# Patient Record
Sex: FEMALE | Race: WHITE | NOT HISPANIC OR LATINO | Employment: FULL TIME | URBAN - METROPOLITAN AREA
[De-identification: names, ages, dates, MRNs, and addresses within clinical notes are randomized per-mention and may not be internally consistent; named-entity substitution may affect disease eponyms.]

---

## 2023-12-19 ENCOUNTER — APPOINTMENT (OUTPATIENT)
Dept: RADIOLOGY | Facility: CLINIC | Age: 48
End: 2023-12-19
Payer: COMMERCIAL

## 2023-12-19 DIAGNOSIS — Z12.31 SCREENING MAMMOGRAM FOR BREAST CANCER: ICD-10-CM

## 2023-12-20 ENCOUNTER — APPOINTMENT (OUTPATIENT)
Dept: OBSTETRICS AND GYNECOLOGY | Facility: CLINIC | Age: 48
End: 2023-12-20
Payer: COMMERCIAL

## 2023-12-21 ENCOUNTER — APPOINTMENT (OUTPATIENT)
Dept: RADIOLOGY | Facility: CLINIC | Age: 48
End: 2023-12-21
Payer: COMMERCIAL

## 2023-12-21 DIAGNOSIS — Z12.31 SCREENING MAMMOGRAM FOR BREAST CANCER: ICD-10-CM

## 2023-12-29 ENCOUNTER — ANCILLARY PROCEDURE (OUTPATIENT)
Dept: RADIOLOGY | Facility: CLINIC | Age: 48
End: 2023-12-29
Payer: COMMERCIAL

## 2023-12-29 DIAGNOSIS — Z12.31 SCREENING MAMMOGRAM FOR BREAST CANCER: ICD-10-CM

## 2023-12-29 PROCEDURE — 77063 BREAST TOMOSYNTHESIS BI: CPT | Performed by: RADIOLOGY

## 2023-12-29 PROCEDURE — 77067 SCR MAMMO BI INCL CAD: CPT | Performed by: RADIOLOGY

## 2023-12-29 PROCEDURE — 77067 SCR MAMMO BI INCL CAD: CPT

## 2024-01-03 ENCOUNTER — APPOINTMENT (OUTPATIENT)
Dept: OBSTETRICS AND GYNECOLOGY | Facility: CLINIC | Age: 49
End: 2024-01-03
Payer: COMMERCIAL

## 2024-01-10 ENCOUNTER — APPOINTMENT (OUTPATIENT)
Dept: RADIOLOGY | Facility: CLINIC | Age: 49
End: 2024-01-10
Payer: COMMERCIAL

## 2024-01-17 ENCOUNTER — OFFICE VISIT (OUTPATIENT)
Dept: PRIMARY CARE | Facility: CLINIC | Age: 49
End: 2024-01-17
Payer: COMMERCIAL

## 2024-01-17 VITALS — WEIGHT: 202 LBS | SYSTOLIC BLOOD PRESSURE: 112 MMHG | DIASTOLIC BLOOD PRESSURE: 72 MMHG

## 2024-01-17 DIAGNOSIS — R92.8 ABNORMAL MAMMOGRAM: Primary | ICD-10-CM

## 2024-01-17 DIAGNOSIS — J30.1 ALLERGIC RHINITIS DUE TO POLLEN, UNSPECIFIED SEASONALITY: ICD-10-CM

## 2024-01-17 DIAGNOSIS — E03.9 HYPOTHYROIDISM, UNSPECIFIED TYPE: ICD-10-CM

## 2024-01-17 DIAGNOSIS — D64.9 ANEMIA, UNSPECIFIED TYPE: ICD-10-CM

## 2024-01-17 DIAGNOSIS — Z00.00 HEALTH CARE MAINTENANCE: ICD-10-CM

## 2024-01-17 DIAGNOSIS — E78.2 MIXED HYPERLIPIDEMIA: ICD-10-CM

## 2024-01-17 PROCEDURE — 99204 OFFICE O/P NEW MOD 45 MIN: CPT | Performed by: INTERNAL MEDICINE

## 2024-01-17 NOTE — PROGRESS NOTES
Subjective   Patient ID: Risa Manning is a 48 y.o. female who presents for No chief complaint on file..    HPI Sick visit have not seen in many years was in [place] father had had bypass surgery she herself has been well taking care of her Hashimoto's disease had been on thyroid medication recent blood test did show an elevated TSH and her dose was increased cholesterol have been lower with diet and exercise chest pain no shortness of breath but had a mammogram when she returned to the [place] and there was some asymmetry on the left side had not had a previous mammogram in [facility] sinus has been ok    Past medical history Hashimoto's hyperlipidemia allergic rhinitis    Medications levothyroxine 125 mg 1/2 tablet/day    Allergies tree pollen no known drug allergies    Social history no tobacco alcohol rare    Family history father ASCVD status post CABG mother Hashimoto's disease    Prevention limited exercise some weight change no recent blood work for review recent mammogram    Review of Systems    Objective   There were no vitals taken for this visit.    Physical Exam  Vital signs noted alert and oriented x 3 NCAT PERRLA EOMI nares clear discharge OP benign TM normal bilateral EAC clear bilateral no AC nodes no JVD or bruit no lid lag no thyromegaly chest clear to auscultation suprapubic seems to abdomen soft nontender normal active bowel sounds LS spine normal curvature negative straight leg raise extremities no clubbing cyanosis or edema normal distal pulses DTR 2+  Assessment/Plan    impression General medical examination?  Abnormal mammogram hypothyroidism hyperlipidemia other diagnoses anemia allergic rhinitis  Plan review any available blood work (none recent) she will send over what she has from what continue with her medication okay to schedule left diagnostic mammogram left breast ultrasound requisition submitted based on previous mammogram results done recently she is not in menopause also  has follow-up with her GYN in February 2024 check on blood count iron and colonoscopy routinely check on thyroid and lipids again in the future, may use flonase or zyrtec as needed for the nasal congestion, then recheck after 5 days TT 50 cc 26

## 2024-01-23 ENCOUNTER — HOSPITAL ENCOUNTER (OUTPATIENT)
Dept: RADIOLOGY | Facility: CLINIC | Age: 49
Discharge: HOME | End: 2024-01-23
Payer: COMMERCIAL

## 2024-01-23 DIAGNOSIS — R92.8 ABNORMAL MAMMOGRAM: ICD-10-CM

## 2024-01-23 PROCEDURE — 77061 BREAST TOMOSYNTHESIS UNI: CPT | Mod: LT

## 2024-01-23 PROCEDURE — 77061 BREAST TOMOSYNTHESIS UNI: CPT | Mod: LEFT SIDE | Performed by: STUDENT IN AN ORGANIZED HEALTH CARE EDUCATION/TRAINING PROGRAM

## 2024-01-23 PROCEDURE — 77065 DX MAMMO INCL CAD UNI: CPT | Mod: LEFT SIDE | Performed by: STUDENT IN AN ORGANIZED HEALTH CARE EDUCATION/TRAINING PROGRAM

## 2024-02-07 ENCOUNTER — OFFICE VISIT (OUTPATIENT)
Dept: OBSTETRICS AND GYNECOLOGY | Facility: CLINIC | Age: 49
End: 2024-02-07
Payer: COMMERCIAL

## 2024-02-07 VITALS
SYSTOLIC BLOOD PRESSURE: 128 MMHG | WEIGHT: 201.8 LBS | BODY MASS INDEX: 28.89 KG/M2 | TEMPERATURE: 98.4 F | HEIGHT: 70 IN | OXYGEN SATURATION: 99 % | DIASTOLIC BLOOD PRESSURE: 84 MMHG | HEART RATE: 75 BPM

## 2024-02-07 DIAGNOSIS — Z01.419 ENCOUNTER FOR ANNUAL ROUTINE GYNECOLOGICAL EXAMINATION: Primary | ICD-10-CM

## 2024-02-07 DIAGNOSIS — Z01.419 PAP SMEAR, AS PART OF ROUTINE GYNECOLOGICAL EXAMINATION: ICD-10-CM

## 2024-02-07 PROCEDURE — 87624 HPV HI-RISK TYP POOLED RSLT: CPT

## 2024-02-07 PROCEDURE — 1036F TOBACCO NON-USER: CPT | Performed by: OBSTETRICS & GYNECOLOGY

## 2024-02-07 PROCEDURE — 88175 CYTOPATH C/V AUTO FLUID REDO: CPT

## 2024-02-07 PROCEDURE — 99386 PREV VISIT NEW AGE 40-64: CPT | Performed by: OBSTETRICS & GYNECOLOGY

## 2024-02-07 ASSESSMENT — PAIN SCALES - GENERAL: PAINLEVEL: 0-NO PAIN

## 2024-02-07 NOTE — PROGRESS NOTES
Subjective   Patient ID: Risa Manning is a 49 y.o. female who [facility]  HPI  Patient is a 49-year-old  1 para 1 white female who had 1  section.  Her last menstrual period was 2024 she said they typically come once a month she will bleed for about 5 to 6 days currently does not use anything for birth control.  She admits to regular bowel movements denies any urinary symptoms.  Medical history significant for Hashimoto's thyroiditis.  She denies cigarette smoking currently works as a .  Family history negative for breast pelvic or colon cancer.  Denies any gynecologic issues.  Previous gynecologic exams in [place].  Review of Systems    Objective   Physical Exam  Gen.: Alert and in no acute distress. Well-developed, well-nourished.  Thyroid: Nonenlarged and no palpable thyroid nodules  Cardiovascular: Heart regular rate and rhythm  Pulmonary: Clear bilateral breath sounds  Breasts: Normal appearance, no nipple discharge and no skin changes. No palpable masses and no axillary lymphadenopathy  Abdomen: Soft and nontender, no abdominal mass palpated, no organomegaly   Pelvic: External genitalia normal, Bartholin's urethral and Belford's glands normal. Vagina normal. Cervix normal. Uterus normal size and shape. Right adnexa normal without masses. Left adnexa normal without masses. Perianal area and normal.  Assessment/Plan   Annual GYN exam, Pap and HPV done, recent normal mammogram with normal additional views.  Follow-up in 1 year or as needed.         Jhonathan Bernal MD 24 12:03 PM

## 2024-02-21 NOTE — RESULT ENCOUNTER NOTE
Your recent Pap smear and HPV test were completely normal. You should follow-up in 1 year for your annual GYN exam. If you have any questions please feel free to contact my office.

## 2024-03-06 ENCOUNTER — TELEPHONE (OUTPATIENT)
Dept: ALLERGY | Facility: CLINIC | Age: 49
End: 2024-03-06
Payer: COMMERCIAL

## 2024-03-11 ENCOUNTER — CONSULT (OUTPATIENT)
Dept: ALLERGY | Facility: CLINIC | Age: 49
End: 2024-03-11
Payer: COMMERCIAL

## 2024-03-11 VITALS
HEART RATE: 75 BPM | HEIGHT: 70 IN | BODY MASS INDEX: 28.49 KG/M2 | DIASTOLIC BLOOD PRESSURE: 81 MMHG | WEIGHT: 199 LBS | SYSTOLIC BLOOD PRESSURE: 120 MMHG

## 2024-03-11 DIAGNOSIS — J30.89 ALLERGIC RHINITIS DUE TO OTHER ALLERGIC TRIGGER, UNSPECIFIED SEASONALITY: Primary | ICD-10-CM

## 2024-03-11 DIAGNOSIS — R05.8 OTHER COUGH: ICD-10-CM

## 2024-03-11 DIAGNOSIS — J30.1 ALLERGIC RHINITIS DUE TO POLLEN, UNSPECIFIED SEASONALITY: ICD-10-CM

## 2024-03-11 DIAGNOSIS — H10.45 CHRONIC ALLERGIC CONJUNCTIVITIS: ICD-10-CM

## 2024-03-11 PROCEDURE — 99204 OFFICE O/P NEW MOD 45 MIN: CPT | Performed by: ALLERGY & IMMUNOLOGY

## 2024-03-11 PROCEDURE — 95004 PERQ TESTS W/ALRGNC XTRCS: CPT | Performed by: ALLERGY & IMMUNOLOGY

## 2024-03-11 RX ORDER — CETIRIZINE HYDROCHLORIDE 10 MG/1
10 TABLET ORAL DAILY PRN
Qty: 30 TABLET | Refills: 11 | Status: SHIPPED | OUTPATIENT
Start: 2024-03-11 | End: 2025-03-11

## 2024-03-11 RX ORDER — FLUTICASONE PROPIONATE 50 MCG
2 SPRAY, SUSPENSION (ML) NASAL DAILY
Qty: 16 G | Refills: 11 | Status: SHIPPED | OUTPATIENT
Start: 2024-03-11 | End: 2025-03-11

## 2024-03-11 RX ORDER — EPINASTINE HYDROCHLORIDE 0.5 MG/ML
1 SOLUTION/ DROPS OPHTHALMIC 2 TIMES DAILY
Qty: 5 ML | Refills: 11 | Status: SHIPPED | OUTPATIENT
Start: 2024-03-11

## 2024-03-11 NOTE — LETTER
Thank you very much for sending your patient for evaluation. If you have any questions or other concerns please let me know.     Best regards, Negro

## 2024-03-11 NOTE — PROGRESS NOTES
"Subjective   Patient ID:   [id number]   Risa Manning is a 49 y.o. female who presents for Allergies and Allergy Testing (1ST 5, FRUITS, VEGETABLES).    Chief Complaint   Patient presents with    Allergies    Allergy Testing     1ST 5, FRUITS, VEGETABLES          HPI  This patient is here to evaluate for:    Tree pollen pollen allergy since age 21yo  Eyes   Nose  Sometimes breathing problems.   And can get a high fever.     Last year,     Allegra 120mg  Cromolyn   Mometasone nasal     Carrots, celery root (ok with the green), cherries, peaches, apples    SH: Lives in [place]    Review of Systems   All other systems reviewed and are negative.        Objective     /81   Pulse 75   Ht 1.778 m (5' 10\")   Wt 90.3 kg (199 lb)   BMI 28.55 kg/m²      Physical Exam  Constitutional:       General: She is not in acute distress.     Appearance: Normal appearance. She is not ill-appearing.   HENT:      Head: Normocephalic and atraumatic.      Right Ear: Tympanic membrane, ear canal and external ear normal.      Left Ear: Tympanic membrane, ear canal and external ear normal.      Nose: Nose normal. No congestion or rhinorrhea.      Mouth/Throat:      Mouth: Mucous membranes are moist.      Pharynx: Oropharynx is clear. No oropharyngeal exudate or posterior oropharyngeal erythema.   Eyes:      General:         Right eye: No discharge.         Left eye: No discharge.      Conjunctiva/sclera: Conjunctivae normal.   Cardiovascular:      Rate and Rhythm: Normal rate and regular rhythm.      Heart sounds: Normal heart sounds. No murmur heard.     No friction rub. No gallop.   Pulmonary:      Effort: Pulmonary effort is normal. No respiratory distress.      Breath sounds: Normal breath sounds. No stridor. No wheezing, rhonchi or rales.   Chest:      Chest wall: No tenderness.   Abdominal:      General: Abdomen is flat.      Palpations: Abdomen is soft.   Musculoskeletal:         General: Normal range of motion.      Cervical " back: Normal range of motion and neck supple.   Lymphadenopathy:      Cervical: No cervical adenopathy.   Skin:     General: Skin is warm and dry.      Findings: No erythema, lesion or rash.   Neurological:      General: No focal deficit present.      Mental Status: She is alert. Mental status is at baseline.   Psychiatric:         Mood and Affect: Mood normal.         Behavior: Behavior normal.         Thought Content: Thought content normal.         Judgment: Judgment normal.            No current outpatient medications on file.     No current facility-administered medications for this visit.       Summary of the labs over the past 6 months:    Office Visit on 02/07/2024   Component Date Value Ref Range Status    Case Report 02/07/2024    Final                    Value:Gynecologic Cytology                              Case: [id number]                                   Authorizing Provider:  Jhonathan Bernal MD         Collected:           02/07/2024 1203              Ordering Location:     [facility]     Received:            02/08/2024 0649              First Screen:          [place], [place]                                                           Specimen:    ThinPrep Liquid-Based Pap-Imaging System Screen, CERVIX, SCREENING                         Final Cytological Interpretation 02/07/2024    Final                    Value:This result contains rich text formatting which cannot be displayed here.      02/07/2024    Final                    Value:This result contains rich text formatting which cannot be displayed here.    ThinPrep Imaging System 02/07/2024    Final                    Value:This result contains rich text formatting which cannot be displayed here.    Educational Note 02/07/2024    Final                    Value:This result contains rich text formatting which cannot be displayed here.    Perform HPV HR test? 02/07/2024 Always (all interpretations)   Final    Include HPV Genotype?  02/07/2024 Yes   Final    LMP 02/07/2024 1/24/2024   Final    HPV, high-risk 02/07/2024 Negative  Negative Final    HPV Type 16 DNA 02/07/2024 Negative  Negative Final    HPV Type 18 DNA 02/07/2024 Negative  Negative Final    HPV non-Type 16 or 18 DNA 02/07/2024 Negative  Negative Final         Assessment/Plan   Diagnoses and all orders for this visit:  Allergic rhinitis due to other allergic trigger, unspecified seasonality  -     fluticasone (Flonase) 50 mcg/actuation nasal spray; Administer 2 sprays into each nostril once daily. Shake gently. Before first use, prime pump. After use, clean tip and replace cap.  -     cetirizine (ZyrTEC) 10 mg tablet; Take 1 tablet (10 mg) by mouth once daily as needed for allergies.  -     epinastine (Elestat) 0.05 % ophthalmic solution; Administer 1 drop into both eyes 2 times a day.  Allergic rhinitis due to pollen, unspecified seasonality  -     fluticasone (Flonase) 50 mcg/actuation nasal spray; Administer 2 sprays into each nostril once daily. Shake gently. Before first use, prime pump. After use, clean tip and replace cap.  -     cetirizine (ZyrTEC) 10 mg tablet; Take 1 tablet (10 mg) by mouth once daily as needed for allergies.  -     epinastine (Elestat) 0.05 % ophthalmic solution; Administer 1 drop into both eyes 2 times a day.  Chronic allergic conjunctivitis  -     fluticasone (Flonase) 50 mcg/actuation nasal spray; Administer 2 sprays into each nostril once daily. Shake gently. Before first use, prime pump. After use, clean tip and replace cap.  -     cetirizine (ZyrTEC) 10 mg tablet; Take 1 tablet (10 mg) by mouth once daily as needed for allergies.  -     epinastine (Elestat) 0.05 % ophthalmic solution; Administer 1 drop into both eyes 2 times a day.  Other cough  -     fluticasone (Flonase) 50 mcg/actuation nasal spray; Administer 2 sprays into each nostril once daily. Shake gently. Before first use, prime pump. After use, clean tip and replace cap.  -      cetirizine (ZyrTEC) 10 mg tablet; Take 1 tablet (10 mg) by mouth once daily as needed for allergies.  -     epinastine (Elestat) 0.05 % ophthalmic solution; Administer 1 drop into both eyes 2 times a day.      It was a pleasure to meet you and we are happy to welcome you to our office. We would be happy to see you at either of our  office locations in [facility] or [place].    We performed allergy testing to help determine the etiology of your symptoms. We discussed the results of the testing. Also, we started to focus on treating your problem and we reviewed the management plan.    We discussed the treatment options for this patient's allergies. I recommended allergy avoidance measures and handouts were given to the patient.  Also, other treatment options include medication and, if not improved or there is a desire to limit medication usage, this patient would qualify for allergy immunotherapy.    Start the medications as above.     Bj Oliva MD

## 2024-09-13 ENCOUNTER — OFFICE VISIT (OUTPATIENT)
Dept: PRIMARY CARE | Facility: CLINIC | Age: 49
End: 2024-09-13
Payer: COMMERCIAL

## 2024-09-13 VITALS — SYSTOLIC BLOOD PRESSURE: 121 MMHG | DIASTOLIC BLOOD PRESSURE: 71 MMHG

## 2024-09-13 DIAGNOSIS — D64.9 ANEMIA, UNSPECIFIED TYPE: ICD-10-CM

## 2024-09-13 DIAGNOSIS — Z00.00 HEALTH CARE MAINTENANCE: Primary | ICD-10-CM

## 2024-09-13 DIAGNOSIS — L24.7 CONTACT DERMATITIS AND ECZEMA DUE TO PLANT: ICD-10-CM

## 2024-09-13 DIAGNOSIS — E03.9 HYPOTHYROIDISM, UNSPECIFIED TYPE: ICD-10-CM

## 2024-09-13 PROCEDURE — 99213 OFFICE O/P EST LOW 20 MIN: CPT | Performed by: INTERNAL MEDICINE

## 2024-09-13 RX ORDER — PREDNISONE 20 MG/1
20 TABLET ORAL 2 TIMES DAILY
Qty: 10 TABLET | Refills: 0 | Status: SHIPPED | OUTPATIENT
Start: 2024-09-13 | End: 2024-09-18

## 2024-09-27 ENCOUNTER — TELEPHONE (OUTPATIENT)
Dept: PRIMARY CARE | Facility: CLINIC | Age: 49
End: 2024-09-27

## 2024-09-27 DIAGNOSIS — E03.9 HYPOTHYROIDISM, UNSPECIFIED TYPE: Primary | ICD-10-CM

## 2024-09-27 RX ORDER — LEVOTHYROXINE SODIUM 50 UG/1
TABLET ORAL
COMMUNITY
Start: 2001-09-01 | End: 2024-09-27 | Stop reason: SDUPTHER

## 2024-09-27 RX ORDER — LEVOTHYROXINE SODIUM 50 UG/1
50 TABLET ORAL DAILY
Qty: 90 TABLET | Refills: 1 | Status: SHIPPED | OUTPATIENT
Start: 2024-09-27

## 2024-12-02 ENCOUNTER — APPOINTMENT (OUTPATIENT)
Dept: PRIMARY CARE | Facility: CLINIC | Age: 49
End: 2024-12-02
Payer: COMMERCIAL

## 2024-12-02 ASSESSMENT — ENCOUNTER SYMPTOMS
COUGH: 1
SORE THROAT: 1
RHINORRHEA: 1

## 2024-12-03 ENCOUNTER — OFFICE VISIT (OUTPATIENT)
Dept: PRIMARY CARE | Facility: CLINIC | Age: 49
End: 2024-12-03
Payer: COMMERCIAL

## 2024-12-03 VITALS — SYSTOLIC BLOOD PRESSURE: 125 MMHG | DIASTOLIC BLOOD PRESSURE: 74 MMHG

## 2024-12-03 DIAGNOSIS — J20.8 ACUTE BACTERIAL BRONCHITIS: ICD-10-CM

## 2024-12-03 DIAGNOSIS — B96.89 ACUTE BACTERIAL BRONCHITIS: ICD-10-CM

## 2024-12-03 DIAGNOSIS — R53.83 OTHER FATIGUE: ICD-10-CM

## 2024-12-03 DIAGNOSIS — E03.9 HYPOTHYROIDISM, UNSPECIFIED TYPE: ICD-10-CM

## 2024-12-03 DIAGNOSIS — Z00.00 HEALTH CARE MAINTENANCE: Primary | ICD-10-CM

## 2024-12-03 PROCEDURE — 99213 OFFICE O/P EST LOW 20 MIN: CPT | Performed by: INTERNAL MEDICINE

## 2024-12-03 RX ORDER — AZITHROMYCIN 250 MG/1
TABLET, FILM COATED ORAL
Qty: 6 TABLET | Refills: 0 | Status: SHIPPED | OUTPATIENT
Start: 2024-12-03 | End: 2024-12-08

## 2024-12-03 NOTE — PROGRESS NOTES
Subjective   Patient ID: Risa Manning is a 49 y.o. female who presents for No chief complaint on file..    HPI   Follow-up visit and sick visit no chest pain no shortness of breath several days ago of over-the-counter products not helping with cough and cold type symptoms no one else is ill around  Review of Systems    Objective   There were no vitals taken for this visit.    Physical Exam vital signs noted alert and oriented x 3 NCAT fatigue and rundown appearance mild coryza nares clear discharge OP benign TM normal bilateral EAC clear bilateral no AC nodes no JVD chest clear to auscultation with deep bronchial breath sounds no wheezing CV rhythm S1-S2 extremities no clubbing cyanosis or edema normal distal pulses    Assessment/Plan    impression  acute bacterial bronchitis fatigue  Plan good diet or exercise when able good water consumption Mucinex twice daily okay for prescription azithromycin 250 mg tablet take 2 tablets first day 1 of each in the next 4 days #1 traditional Z-Javi and 0 refills Tylenol as needed and recheck if no better and for regular visit if fatigue is not letting up

## 2024-12-07 RX ORDER — LEVOTHYROXINE SODIUM 50 UG/1
50 TABLET ORAL DAILY
Qty: 90 TABLET | Refills: 1 | Status: SHIPPED | OUTPATIENT
Start: 2024-12-07

## 2025-03-23 ENCOUNTER — OFFICE VISIT (OUTPATIENT)
Dept: URGENT CARE | Age: 50
End: 2025-03-23
Payer: COMMERCIAL

## 2025-03-23 VITALS
OXYGEN SATURATION: 95 % | DIASTOLIC BLOOD PRESSURE: 77 MMHG | HEART RATE: 102 BPM | SYSTOLIC BLOOD PRESSURE: 113 MMHG | TEMPERATURE: 98 F

## 2025-03-23 DIAGNOSIS — R05.9 COUGH, UNSPECIFIED TYPE: ICD-10-CM

## 2025-03-23 DIAGNOSIS — J10.1 INFLUENZA B: Primary | ICD-10-CM

## 2025-03-23 LAB
POC CORONAVIRUS SARS-COV-2 PCR: NEGATIVE
POC HUMAN RHINOVIRUS PCR: NEGATIVE
POC INFLUENZA A VIRUS PCR: NEGATIVE
POC INFLUENZA B VIRUS PCR: POSITIVE
POC RESPIRATORY SYNCYTIAL VIRUS PCR: NEGATIVE

## 2025-03-23 PROCEDURE — 87631 RESP VIRUS 3-5 TARGETS: CPT

## 2025-03-23 PROCEDURE — 99204 OFFICE O/P NEW MOD 45 MIN: CPT

## 2025-03-23 ASSESSMENT — ENCOUNTER SYMPTOMS
BACK PAIN: 1
FEVER: 1
FATIGUE: 1
COUGH: 1

## 2025-03-23 NOTE — PATIENT INSTRUCTIONS
You were seen at Urgent Care today and diagnosed with influenza B.  Please treat as discussed. Monitor for red flags which we spoke about, If your symptoms change, worsen or become concerning in any way, please go to the emergency room immediately, otherwise you can followup with your PCP in 2-3 days as needed

## 2025-03-23 NOTE — PROGRESS NOTES
Subjective   Patient ID: Risa Manning is a 50 y.o. female. They present today with a chief complaint of Fever (X3days ago temp was 102.3, took tylenol yesterday ), Cough (Productive cough started yesterday with yellow phlegm ), and Back Pain (Pt states x3days constant lower back pain).    History of Present Illness  Patient is a 50-year-old female with history of Hashimoto thyroiditis who presents urgent care today with a complaint of ongoing, worsening flulike symptoms.  Specifically she endorses fever, productive cough and generalized fatigue since Friday.  She also notes back pain which developed yesterday.  She states the pain in her back is worse with movement and palpation.  She has been taking Tylenol for her symptoms without significant improvement.  She does have chest discomfort associated with her cough.  She denies any chest pain, shortness of breath, lightheadedness, dizziness or any other symptoms.      History provided by:  Patient  Fever   Associated symptoms include coughing.   Cough  Associated symptoms include a fever.   Back Pain  Associated symptoms include a fever.       Past Medical History  Allergies as of 2025 - Reviewed 2025   Allergen Reaction Noted    Amoxicillin Hives 2025    Doxycycline Other 2025       (Not in a hospital admission)         Past Medical History:   Diagnosis Date    Personal history of other endocrine, nutritional and metabolic disease 2017    History of Hashimoto thyroiditis       Past Surgical History:   Procedure Laterality Date     SECTION, CLASSIC  2017     Section        reports that she has never smoked. She has never used smokeless tobacco.    Review of Systems  Review of Systems   Constitutional:  Positive for fatigue and fever.   Respiratory:  Positive for cough.    Musculoskeletal:  Positive for back pain.                                  Objective    Vitals:    25 0852   BP: 113/77   Pulse: 102    Temp: 36.7 °C (98 °F)   SpO2: 95%     No LMP recorded.    Physical Exam  Vitals and nursing note reviewed.   Constitutional:       General: She is not in acute distress.     Appearance: Normal appearance. She is not ill-appearing, toxic-appearing or diaphoretic.   HENT:      Head: Normocephalic and atraumatic.      Nose: Nose normal.      Mouth/Throat:      Mouth: Mucous membranes are moist.   Eyes:      Extraocular Movements: Extraocular movements intact.      Conjunctiva/sclera: Conjunctivae normal.      Pupils: Pupils are equal, round, and reactive to light.   Cardiovascular:      Rate and Rhythm: Regular rhythm. Tachycardia present.      Pulses: Normal pulses.      Heart sounds: Normal heart sounds.   Pulmonary:      Effort: Pulmonary effort is normal. No respiratory distress.      Breath sounds: Normal breath sounds. No stridor. No wheezing, rhonchi or rales.   Chest:      Chest wall: No tenderness.   Musculoskeletal:         General: Normal range of motion.      Cervical back: Normal range of motion and neck supple. Spasms and tenderness present.      Lumbar back: Spasms and tenderness present.        Back:    Skin:     General: Skin is warm and dry.      Capillary Refill: Capillary refill takes less than 2 seconds.   Neurological:      General: No focal deficit present.      Mental Status: She is alert and oriented to person, place, and time.   Psychiatric:         Mood and Affect: Mood normal.         Behavior: Behavior normal.         Procedures      Assessment/Plan   Allergies, medications, history, and pertinent labs/EKGs/Imaging reviewed by VIRAJ Veloz.     Medical Decision Making    Patient is well appearing, afebrile, non toxic, not hypoxic, and appropriate for outpatient treatment and management at time of evaluation. Patient presents with ongoing, worsening flulike symptoms for 2 days.     Differential includes but not limited to: COVID, influenza, RSV, rhinovirus, pneumonia, URI,  pyelonephritis, other    On exam, patient is alert and oriented.  She is no acute distress.  She is answering questions appropriately.  She is afebrile and appears well-hydrated.  She is slightly tachycardic at a rate of 102 but vitals are otherwise within normal limits.  Oxygen saturation on room air is 95%.  Lung sounds are clear in all fields bilaterally.  Abdomen soft nontender.  Patient denies any urinary symptoms including urgency/frequency/burning.  She does have tenderness with palpation across the lumbar area bilaterally as well as bilateral trapezius.  No obvious signs of trauma, ecchymosis or edema.  No CVA tenderness.  Exam otherwise unremarkable and as described above.    COVID PCR is negative.  Rapid RSV and rhinovirus are negative.  Rapid influenza B is positive.  Offered to provide her with some ibuprofen for her muscle soreness but she declined stating she can take some at home.    Counseling: Spoke with the patient and discussed today´s findings, in addition to providing specific details for the plan of care and expected course.  Patient was given the opportunity to ask questions.     Discussed return precautions and importance of follow-up. Advised to follow-up with PCP.  Also recommended over-the-counter pain/flu medication as well as heating pads and lidocaine patches.  I specifically advised to go to the ED for changing or worsening symptoms, new symptoms, complaint specific precautions, and precautions listed on the discharge paperwork.    I advised the patient that the urgent care evaluation and treatment provided today doesn't end their need for medical care. It is very important that they follow-up with their primary care provider or other specialist as instructed.     The plan of care was mutually agreed upon with the patient. The patient and/or family were given the opportunity to ask questions. All questions and concerns were answered to the best of my ability with today's information.   Patient was discharged in stable condition.    Dictation software was used in the creation of this note which does not evaluate or correct for typographical, spelling, syntax or grammatical errors.    Orders and Diagnoses  Diagnoses and all orders for this visit:  Cough, unspecified type  -     POCT SPOTFIRE R/ST Panel Mini w/COVID ([facility]) manually resulted      Medical Admin Record      Follow Up Instructions  No follow-ups on file.    Patient disposition: Home    Electronically signed by VIRAJ Veloz  8:57 AM

## 2025-04-14 ENCOUNTER — HOSPITAL ENCOUNTER (OUTPATIENT)
Dept: RADIOLOGY | Facility: CLINIC | Age: 50
Discharge: HOME | End: 2025-04-14
Payer: COMMERCIAL

## 2025-04-14 VITALS — HEIGHT: 70 IN | BODY MASS INDEX: 28.5 KG/M2 | WEIGHT: 199.08 LBS

## 2025-04-14 DIAGNOSIS — Z12.31 SCREENING MAMMOGRAM FOR BREAST CANCER: ICD-10-CM

## 2025-04-14 PROCEDURE — 77067 SCR MAMMO BI INCL CAD: CPT

## 2025-04-14 PROCEDURE — 77067 SCR MAMMO BI INCL CAD: CPT | Performed by: RADIOLOGY

## 2025-04-14 PROCEDURE — 77063 BREAST TOMOSYNTHESIS BI: CPT | Performed by: RADIOLOGY

## 2025-04-22 ENCOUNTER — APPOINTMENT (OUTPATIENT)
Dept: PRIMARY CARE | Facility: CLINIC | Age: 50
End: 2025-04-22
Payer: COMMERCIAL

## 2025-04-23 LAB
ERYTHROCYTE [DISTWIDTH] IN BLOOD BY AUTOMATED COUNT: 14.9 % (ref 11–15)
HCT VFR BLD AUTO: 36.2 % (ref 35–45)
HGB BLD-MCNC: 10.8 G/DL (ref 11.7–15.5)
MCH RBC QN AUTO: 23.6 PG (ref 27–33)
MCHC RBC AUTO-ENTMCNC: 29.8 G/DL (ref 32–36)
MCV RBC AUTO: 79 FL (ref 80–100)
PLATELET # BLD AUTO: 343 THOUSAND/UL (ref 140–400)
PMV BLD REES-ECKER: 8.7 FL (ref 7.5–12.5)
RBC # BLD AUTO: 4.58 MILLION/UL (ref 3.8–5.1)
TSH SERPL-ACNC: 3.81 MIU/L
WBC # BLD AUTO: 5.1 THOUSAND/UL (ref 3.8–10.8)

## 2025-07-29 DIAGNOSIS — E03.9 HYPOTHYROIDISM, UNSPECIFIED TYPE: ICD-10-CM

## 2025-08-01 ENCOUNTER — OFFICE VISIT (OUTPATIENT)
Dept: PRIMARY CARE | Facility: CLINIC | Age: 50
End: 2025-08-01
Payer: COMMERCIAL

## 2025-08-01 VITALS — SYSTOLIC BLOOD PRESSURE: 108 MMHG | BODY MASS INDEX: 29.99 KG/M2 | DIASTOLIC BLOOD PRESSURE: 68 MMHG | WEIGHT: 209 LBS

## 2025-08-01 DIAGNOSIS — D64.9 ANEMIA, UNSPECIFIED TYPE: ICD-10-CM

## 2025-08-01 DIAGNOSIS — E03.9 HYPOTHYROIDISM, UNSPECIFIED TYPE: Primary | ICD-10-CM

## 2025-08-01 DIAGNOSIS — Z00.00 HEALTH CARE MAINTENANCE: ICD-10-CM

## 2025-08-01 PROCEDURE — 99213 OFFICE O/P EST LOW 20 MIN: CPT | Performed by: INTERNAL MEDICINE

## 2025-08-01 RX ORDER — LEVOTHYROXINE SODIUM 50 UG/1
TABLET ORAL
Qty: 90 TABLET | Refills: 1 | Status: SHIPPED | OUTPATIENT
Start: 2025-08-01

## 2025-08-01 NOTE — PROGRESS NOTES
Subjective   Patient ID: Risa Manning is a 50 y.o. female who presents for No chief complaint on file..    HPI follow-up visit matriculating at [place] for speech therapy he needs forms filled out no chest pain no shortness of breath questions about her thyroid and ultrasounds as well as the medication and recent blood work    Review of Systems    Objective   There were no vitals taken for this visit.    Physical Exam  Vital signs noted alert and oriented x 3 NCAT no JVD chest clear to auscultation CV regular rate and rhythm S1-S2 no lid lag no thyromegaly extremities no clubbing cyanosis or edema normal distal pulses DTR 2+  Assessment/Plan   {Assess/PlanSmartLinks:87600}     Impression General Health examination hypothyroid  Plan reviewed prior lab work and medications  She had a hepatitis B vaccine but no documentation may need hepatitis B surface antibody  Had chickenpox uncertain of the date did not have that vaccine may need titer  Need MMR titer  She is uncertain if she had BCG vaccine so maybe T spot or chest x-ray  Fairly certain she has not had DTaP but will review and if so may need that and/or a hepatitis B booster depending on results fill out forms and then recheck based on above    Review of thyroid ultrasound was needed